# Patient Record
Sex: FEMALE | Race: WHITE | Employment: PART TIME | URBAN - METROPOLITAN AREA
[De-identification: names, ages, dates, MRNs, and addresses within clinical notes are randomized per-mention and may not be internally consistent; named-entity substitution may affect disease eponyms.]

---
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
## 2021-01-03 ENCOUNTER — HEALTH MAINTENANCE LETTER (OUTPATIENT)
Age: 21
End: 2021-01-03

## 2021-02-11 ENCOUNTER — TELEPHONE (OUTPATIENT)
Dept: PEDIATRICS | Facility: CLINIC | Age: 21
End: 2021-02-11

## 2021-02-11 NOTE — TELEPHONE ENCOUNTER
[facility] Call Center    Phone Message    May a detailed message be left on voicemail: yes     Reason for Call: Other: Pt called to schedule return visit     Rachael called and stated she would like to schedule a post-op return visit with Dr. Rahman. Please give her a call soon to help schedule.     Action Taken: Message routed to:  Other: Ped's weight mgmt    Travel Screening: Not Applicable

## 2021-02-12 NOTE — TELEPHONE ENCOUNTER
Called and left message re: Calling to discuss scheduling follow up.  Next available with Dr. Rahman is 3/4/21.  Otherwise, we can schedule for 4/1/21 to see Inez Perez, and Dr. Crawford.  Please call back to discuss what you would like to do.  Left direct call back number.

## 2021-03-01 ENCOUNTER — TELEPHONE (OUTPATIENT)
Dept: PEDIATRICS | Facility: CLINIC | Age: 21
End: 2021-03-01

## 2021-03-01 NOTE — TELEPHONE ENCOUNTER
Called Rachael and left message re; Reminder of appointment with Dr. Rahman on 3/4/21.  Left direct call back number for questions or concerns.

## 2021-03-04 NOTE — PROGRESS NOTES
"      Date: 2021    PATIENT:  Rachael Dao  :          2000  LIANNE:          Mar 4, 2021    Dear Jenna Jo:    I had the pleasure of seeing your patient, Rachael Dao, for a follow-up visit in the [facility] on Mar 4, 2021 at the [facility]. Please see below for my assessment and plan of care.      As you may recall, Rachael is a 20 year old young lady with depression, anxiety, significant asthma and history of ovarian cysts who presents with class 3 obesity.  She had bariatric surgery (LSG) in 2020 (8 mos ago) and presents for a routine follow-up visit.    Intercurrent History:  Since December, Rachael has been having frequent episodes of \"passing out\" and feeling woozey.  She gets light headed, shakey and sweaty.  Eating bread helps the symptoms.  She met with cardiology and neurology - had a tilt table test and 2 EEGs and a holter monitor.  There was a question of POTS but she notes that she experiences an increase in HR but a decrease in BP when goes from recumbent position to standing.  Eating high Na diet helps and she was also started on Florinef and midodrine.  Her BG was checked a few times but was not very low - details are unclear.    Lowest weight was 245 lbs in .  Prn omeparzole    Been on Paxil since October. Helping a little, for depression.  Sees a therapist - but not in a couple mos bc therapist had a baby and will be seeing again next week.   PCP prescribes Paxil.    Review of Systems:  A 10 pt ROS was completed and otherwise negative except as noted above or below.  She is sleeping in late in the day.      Past Medical, Surgical, Social, and Family Histories:  were reviewed today and updated as appropriate.  Was a [employer] until Dec and when she was passing out had to drop out and went to [employer] and now dropped out bc of persistent symptoms.    Current Medications:  Current Outpatient Rx   Medication Sig " "Dispense Refill     acetaminophen (TYLENOL) 325 MG tablet Take 325-650 mg by mouth every 6 hours as needed for mild pain       blood glucose (NO BRAND SPECIFIED) test strip Use to test blood sugar  as directed. 100 strip 0     blood glucose monitoring (NO BRAND SPECIFIED) meter device kit Use to test blood sugar  as directed. 1 kit 0     budesonide-formoterol (SYMBICORT) 160-4.5 MCG/ACT Inhaler Inhale 2 puffs into the lungs At Bedtime        cyanocobalamin (CYANOCOBALAMIN) 1000 MCG/ML injection Inject 1 mL (1,000 mcg) Subcutaneous every 30 days 1 mL 11     fludrocortisone (FLORINEF) 0.1 MG tablet Take 0.1 mg by mouth       hydrocortisone (WESTCORT) 0.2 % external cream Apply topically 2 times daily 15 g 1     hydrocortisone 2.5 % cream Apply topically as needed        JUNEL FE 1.5/30 1.5-30 MG-MCG tablet Take 1 tablet by mouth every evening   3     levalbuterol (XOPENEX) 1.25 MG/3ML neb solution Take 1 ampule by nebulization as needed        midodrine (PROAMATINE) 5 MG tablet TAKE 1 TABLET BY MOUTH 3 TIMES A DAY INSTR PLEASE TAKE THE LAST DOSE OF THE DAY AT 6 PM       montelukast (SINGULAIR) 10 MG tablet Take 10 mg by mouth At Bedtime   0     ondansetron (ZOFRAN-ODT) 4 MG ODT tab Take 1 tablet (4 mg) by mouth every 6 hours as needed for nausea or vomiting 10 tablet 0     PARoxetine (PAXIL-CR) 25 MG 24 hr tablet Take 2 tablets (50 mg) by mouth every morning 60 tablet 0     PARoxetine HCl (PAXIL PO) Take 50 mg by mouth every morning       senna-docusate (SENOKOT-S/PERICOLACE) 8.6-50 MG tablet Take 2 tablets by mouth 2 times daily 60 tablet 0     Syringe/Needle, Disp, (BD ECLIPSE SYRINGE) 25G X 5/8\" 3 ML MISC 1 Syringe every 30 days 1 each 11     vitamin B-12 (CYANOCOBALAMIN) 100 MCG tablet Take 1,000 mcg by mouth every morning         Physical Exam:    Vitals:    B/P:   BP Readings from Last 1 Encounters:   04/01/21 131/79     BP:  Blood pressure percentiles are not available for patients who are 18 years or " "older.  P:   Pulse Readings from Last 1 Encounters:   04/01/21 77     R: @LASTBRATE(1)@    Weight:  Wt Readings from Last 4 Encounters:   04/01/21 118.3 kg (260 lb 12.9 oz) (>99 %, Z= 2.58)*   11/12/20 125 kg (275 lb 8 oz) (>99 %, Z= 2.66)*   08/07/20 133.4 kg (294 lb) (>99 %, Z= 2.73)*   08/04/20 138.3 kg (305 lb) (>99 %, Z= 2.78)*     * Growth percentiles are based on CDC (Girls, 2-20 Years) data.     Height:    Ht Readings from Last 4 Encounters:   04/01/21 1.666 m (5' 5.59\") (70 %, Z= 0.51)*   11/12/20 1.66 m (5' 5.35\") (66 %, Z= 0.42)*   08/07/20 1.676 m (5' 6\") (75 %, Z= 0.68)*   08/04/20 1.676 m (5' 6\") (75 %, Z= 0.68)*     * Growth percentiles are based on CDC (Girls, 2-20 Years) data.       Body Mass Index:  Body mass index is 42.62 kg/m .  Body Mass Index Percentile:  99 %ile (Z= 2.25) based on CDC (Girls, 2-20 Years) BMI-for-age based on BMI available as of 4/1/2021.    Labs:    Results for orders placed or performed in visit on 04/01/21   CBC with platelets [YSB446]     Status: Abnormal   Result Value Ref Range    WBC 12.2 (H) 4.0 - 11.0 10e9/L    RBC Count 4.59 3.8 - 5.2 10e12/L    Hemoglobin 12.2 11.7 - 15.7 g/dL    Hematocrit 38.0 35.0 - 47.0 %    MCV 83 78 - 100 fl    MCH 26.6 26.5 - 33.0 pg    MCHC 32.1 31.5 - 36.5 g/dL    RDW 13.5 10.0 - 15.0 %    Platelet Count 449 150 - 450 10e9/L   Comprehensive metabolic panel [LAB17]     Status: None   Result Value Ref Range    Sodium 137 133 - 144 mmol/L    Potassium 4.1 3.4 - 5.3 mmol/L    Chloride 107 96 - 110 mmol/L    Carbon Dioxide 22 20 - 32 mmol/L    Anion Gap 8 3 - 14 mmol/L    Glucose 80 70 - 99 mg/dL    Urea Nitrogen 9 7 - 30 mg/dL    Creatinine 0.67 0.50 - 1.00 mg/dL    GFR Estimate >90 >60 mL/min/[1.73_m2]    GFR Estimate If Black >90 >60 mL/min/[1.73_m2]    Calcium 9.7 8.5 - 10.1 mg/dL    Bilirubin Total 0.5 0.2 - 1.3 mg/dL    Albumin 3.7 3.4 - 5.0 g/dL    Protein Total 8.0 6.8 - 8.8 g/dL    Alkaline Phosphatase 86 40 - 150 U/L    ALT 21 0 - 50 " U/L    AST 10 0 - 35 U/L   Vitamin D [MJI279]     Status: None   Result Value Ref Range    Vitamin D Deficiency screening 26 20 - 75 ug/L   Parathyroid Hormone Intact [HMV590]     Status: None   Result Value Ref Range    Parathyroid Hormone Intact 58 18 - 80 pg/mL   Vitamin B12 [LAB67]     Status: None   Result Value Ref Range    Vitamin B12 475 193 - 986 pg/mL   Ferritin [LAB68]     Status: None   Result Value Ref Range    Ferritin 50 12 - 150 ng/mL   Hemoglobin A1c [LAB90]     Status: None   Result Value Ref Range    Hemoglobin A1C 5.0 0 - 5.6 %         Assessment:      Rachael is a 20 year old female with depression, anxiety, significant asthma and history of ovarian cysts who presents with class 3 obesity. She is now 8 mos s/p laparoscopic sleeve gastrectomy.  While her weight loss is appropriate she is experiencing recurrent syncopal and near-syncopal episodes of unclear etiology.  She reports having had an extensive work up with a neurologist and cardiologist, including a tilt table test, EEG, holter monitor.  Etiology has not been identified and relationship to eating and blood glucose levels have not been examined closely yet.  Indeed if sx happen after eating, this may be reactive hypoglycemia.  We discussed her weight management and indicates that although she gained about 15 lbs from her elizabeth, she would like to focus on getting her symptoms under control before considering anti obesity medication.  This is very appropriate, particularly bc the high Na diet and florinef are making her retain water and feel bloated.  Her mood is ok but she does not think the Paxil is helping and wants to change it.      Rachael s current problem list reviewed today includes:    Encounter Diagnoses   Name Primary?     Severe obesity (BMI >= 40) (H) Yes     Light headedness      Depression, unspecified depression type      S/P laparoscopic sleeve gastrectomy         Care Plan:  Class 3 obesity  -meet with RD today to discuss high  protein diet  -continue supplements - MVI and Ca and vit B12 injections    Syncopal Episodes  -check blood sugar at beginning, middle and end of episodes  -eat frequent small meals of protein    Depression/anxiety  -I will refill Paxil until she sees her PCP    We are looking forward to seeing Rachael for a follow-up visit in 4 weeks.    Thank you for including me in the care of your patient.  Please do not hesitate to call with questions or concerns.    Sincerely,    Guerita Rahman MD MPH  Diplomate, American Board of Obesity Medicine    Director, Pediatric Weight Management Clinic  Department of Pediatrics  [facility] [phone]  [facility] [phone]  [facility] [phone]  [facility] [place] [phone]            CC  Copy to patient  Kimberly Dao Dennis     [facility] [id number]

## 2021-03-11 NOTE — TELEPHONE ENCOUNTER
Refill request from pharmacy for Actigall/Ursodiol. Refill denied. Patient no longer needs this medication. Patient is more than 6 month post sleeve gastrectomy. [employer] message sent to patient to notify.

## 2021-03-29 ENCOUNTER — TELEPHONE (OUTPATIENT)
Dept: PEDIATRICS | Facility: CLINIC | Age: 21
End: 2021-03-29

## 2021-03-29 NOTE — TELEPHONE ENCOUNTER
Called and spoke to Rachael.  Reminded her of follow up appointment with Dr. Rahman.  Rachael reports she has a ride and will be able to make it to the appointment.  Rachael had no other questions at this time.

## 2021-04-01 ENCOUNTER — OFFICE VISIT (OUTPATIENT)
Dept: PEDIATRICS | Facility: CLINIC | Age: 21
End: 2021-04-01
Attending: DIETITIAN, REGISTERED
Payer: COMMERCIAL

## 2021-04-01 ENCOUNTER — OFFICE VISIT (OUTPATIENT)
Dept: PEDIATRICS | Facility: CLINIC | Age: 21
End: 2021-04-01
Attending: PEDIATRICS
Payer: COMMERCIAL

## 2021-04-01 VITALS
SYSTOLIC BLOOD PRESSURE: 131 MMHG | WEIGHT: 260.8 LBS | DIASTOLIC BLOOD PRESSURE: 79 MMHG | HEART RATE: 77 BPM | HEIGHT: 66 IN | BODY MASS INDEX: 41.91 KG/M2

## 2021-04-01 DIAGNOSIS — F32.A DEPRESSION, UNSPECIFIED DEPRESSION TYPE: ICD-10-CM

## 2021-04-01 DIAGNOSIS — E66.01 SEVERE OBESITY (BMI >= 40) (H): Primary | ICD-10-CM

## 2021-04-01 DIAGNOSIS — Z98.84 S/P LAPAROSCOPIC SLEEVE GASTRECTOMY: ICD-10-CM

## 2021-04-01 DIAGNOSIS — R42 LIGHT HEADEDNESS: ICD-10-CM

## 2021-04-01 DIAGNOSIS — Z98.84 STATUS POST BARIATRIC SURGERY: ICD-10-CM

## 2021-04-01 PROBLEM — R55 SYNCOPE: Status: ACTIVE | Noted: 2021-02-03

## 2021-04-01 LAB
ALBUMIN SERPL-MCNC: 3.7 G/DL (ref 3.4–5)
ALP SERPL-CCNC: 86 U/L (ref 40–150)
ALT SERPL W P-5'-P-CCNC: 21 U/L (ref 0–50)
ANION GAP SERPL CALCULATED.3IONS-SCNC: 8 MMOL/L (ref 3–14)
AST SERPL W P-5'-P-CCNC: 10 U/L (ref 0–35)
BILIRUB SERPL-MCNC: 0.5 MG/DL (ref 0.2–1.3)
BUN SERPL-MCNC: 9 MG/DL (ref 7–30)
CALCIUM SERPL-MCNC: 9.7 MG/DL (ref 8.5–10.1)
CHLORIDE SERPL-SCNC: 107 MMOL/L (ref 96–110)
CO2 SERPL-SCNC: 22 MMOL/L (ref 20–32)
CREAT SERPL-MCNC: 0.67 MG/DL (ref 0.5–1)
DEPRECATED CALCIDIOL+CALCIFEROL SERPL-MC: 26 UG/L (ref 20–75)
ERYTHROCYTE [DISTWIDTH] IN BLOOD BY AUTOMATED COUNT: 13.5 % (ref 10–15)
FERRITIN SERPL-MCNC: 50 NG/ML (ref 12–150)
GFR SERPL CREATININE-BSD FRML MDRD: >90 ML/MIN/{1.73_M2}
GLUCOSE SERPL-MCNC: 80 MG/DL (ref 70–99)
HBA1C MFR BLD: 5 % (ref 0–5.6)
HCT VFR BLD AUTO: 38 % (ref 35–47)
HGB BLD-MCNC: 12.2 G/DL (ref 11.7–15.7)
MCH RBC QN AUTO: 26.6 PG (ref 26.5–33)
MCHC RBC AUTO-ENTMCNC: 32.1 G/DL (ref 31.5–36.5)
MCV RBC AUTO: 83 FL (ref 78–100)
PLATELET # BLD AUTO: 449 10E9/L (ref 150–450)
POTASSIUM SERPL-SCNC: 4.1 MMOL/L (ref 3.4–5.3)
PROT SERPL-MCNC: 8 G/DL (ref 6.8–8.8)
PTH-INTACT SERPL-MCNC: 58 PG/ML (ref 18–80)
RBC # BLD AUTO: 4.59 10E12/L (ref 3.8–5.2)
SODIUM SERPL-SCNC: 137 MMOL/L (ref 133–144)
VIT B12 SERPL-MCNC: 475 PG/ML (ref 193–986)
WBC # BLD AUTO: 12.2 10E9/L (ref 4–11)

## 2021-04-01 PROCEDURE — G0463 HOSPITAL OUTPT CLINIC VISIT: HCPCS

## 2021-04-01 PROCEDURE — 85027 COMPLETE CBC AUTOMATED: CPT | Performed by: PEDIATRICS

## 2021-04-01 PROCEDURE — 82728 ASSAY OF FERRITIN: CPT | Performed by: PEDIATRICS

## 2021-04-01 PROCEDURE — 80053 COMPREHEN METABOLIC PANEL: CPT | Performed by: PEDIATRICS

## 2021-04-01 PROCEDURE — 82607 VITAMIN B-12: CPT | Performed by: PEDIATRICS

## 2021-04-01 PROCEDURE — 83036 HEMOGLOBIN GLYCOSYLATED A1C: CPT | Performed by: PEDIATRICS

## 2021-04-01 PROCEDURE — 97803 MED NUTRITION INDIV SUBSEQ: CPT | Performed by: DIETITIAN, REGISTERED

## 2021-04-01 PROCEDURE — 99214 OFFICE O/P EST MOD 30 MIN: CPT | Performed by: PEDIATRICS

## 2021-04-01 PROCEDURE — 82306 VITAMIN D 25 HYDROXY: CPT | Performed by: PEDIATRICS

## 2021-04-01 PROCEDURE — 36415 COLL VENOUS BLD VENIPUNCTURE: CPT | Performed by: PEDIATRICS

## 2021-04-01 PROCEDURE — 83970 ASSAY OF PARATHORMONE: CPT | Performed by: PEDIATRICS

## 2021-04-01 RX ORDER — FLUDROCORTISONE ACETATE 0.1 MG/1
0.1 TABLET ORAL
COMMUNITY

## 2021-04-01 RX ORDER — MIDODRINE HYDROCHLORIDE 5 MG/1
TABLET ORAL
COMMUNITY
Start: 2021-02-22

## 2021-04-01 RX ORDER — PAROXETINE HYDROCHLORIDE HEMIHYDRATE 25 MG/1
50 TABLET, FILM COATED, EXTENDED RELEASE ORAL EVERY MORNING
Qty: 60 TABLET | Refills: 0 | Status: SHIPPED | OUTPATIENT
Start: 2021-04-01

## 2021-04-01 ASSESSMENT — PAIN SCALES - GENERAL: PAINLEVEL: NO PAIN (0)

## 2021-04-01 ASSESSMENT — MIFFLIN-ST. JEOR: SCORE: 1968.26

## 2021-04-01 NOTE — NURSING NOTE
"[facility] [[id number]]  Chief Complaint   Patient presents with     RECHECK     weight management     Initial /79   Pulse 77   Ht 5' 5.59\" (166.6 cm)   Wt 260 lb 12.9 oz (118.3 kg)   BMI 42.62 kg/m   Estimated body mass index is 42.62 kg/m  as calculated from the following:    Height as of this encounter: 5' 5.59\" (166.6 cm).    Weight as of this encounter: 260 lb 12.9 oz (118.3 kg).  Medication Reconciliation: complete   Kelly Lim LPN      "

## 2021-04-01 NOTE — LETTER
"  2021      RE: Rachael Dao  [address]  [place] [address]             Date: 2021    PATIENT:  Rachael Dao  :          2000  LIANNE:          Mar 4, 2021    Dear Jenna Jo:    I had the pleasure of seeing your patient, Rachael Dao, for a follow-up visit in the [facility] on Mar 4, 2021 at the [facility]. Please see below for my assessment and plan of care.      As you may recall, Rachael is a 20 year old young lady with depression, anxiety, significant asthma and history of ovarian cysts who presents with class 3 obesity.  She had bariatric surgery (LSG) in 2020 (8 mos ago) and presents for a routine follow-up visit.    Intercurrent History:  Since December, Rachael has been having frequent episodes of \"passing out\" and feeling woozey.  She gets light headed, shakey and sweaty.  Eating bread helps the symptoms.  She met with cardiology and neurology - had a tilt table test and 2 EEGs and a holter monitor.  There was a question of POTS but she notes that she experiences an increase in HR but a decrease in BP when goes from recumbent position to standing.  Eating high Na diet helps and she was also started on Florinef and midodrine.  Her BG was checked a few times but was not very low - details are unclear.    Lowest weight was 245 lbs in .  Prn omeparzole    Been on Paxil since October. Helping a little, for depression.  Sees a therapist - but not in a couple mos bc therapist had a baby and will be seeing again next week.   PCP prescribes Paxil.    Review of Systems:  A 10 pt ROS was completed and otherwise negative except as noted above or below.  She is sleeping in late in the day.      Past Medical, Surgical, Social, and Family Histories:  were reviewed today and updated as appropriate.  Was a [employer] until Dec and when she was passing out had to drop out and went to [employer] and now dropped out bc of persistent " "symptoms.    Current Medications:  Current Outpatient Rx   Medication Sig Dispense Refill     acetaminophen (TYLENOL) 325 MG tablet Take 325-650 mg by mouth every 6 hours as needed for mild pain       blood glucose (NO BRAND SPECIFIED) test strip Use to test blood sugar  as directed. 100 strip 0     blood glucose monitoring (NO BRAND SPECIFIED) meter device kit Use to test blood sugar  as directed. 1 kit 0     budesonide-formoterol (SYMBICORT) 160-4.5 MCG/ACT Inhaler Inhale 2 puffs into the lungs At Bedtime        cyanocobalamin (CYANOCOBALAMIN) 1000 MCG/ML injection Inject 1 mL (1,000 mcg) Subcutaneous every 30 days 1 mL 11     fludrocortisone (FLORINEF) 0.1 MG tablet Take 0.1 mg by mouth       hydrocortisone (WESTCORT) 0.2 % external cream Apply topically 2 times daily 15 g 1     hydrocortisone 2.5 % cream Apply topically as needed        JUNEL FE 1.5/30 1.5-30 MG-MCG tablet Take 1 tablet by mouth every evening   3     levalbuterol (XOPENEX) 1.25 MG/3ML neb solution Take 1 ampule by nebulization as needed        midodrine (PROAMATINE) 5 MG tablet TAKE 1 TABLET BY MOUTH 3 TIMES A DAY INSTR PLEASE TAKE THE LAST DOSE OF THE DAY AT 6 PM       montelukast (SINGULAIR) 10 MG tablet Take 10 mg by mouth At Bedtime   0     ondansetron (ZOFRAN-ODT) 4 MG ODT tab Take 1 tablet (4 mg) by mouth every 6 hours as needed for nausea or vomiting 10 tablet 0     PARoxetine (PAXIL-CR) 25 MG 24 hr tablet Take 2 tablets (50 mg) by mouth every morning 60 tablet 0     PARoxetine HCl (PAXIL PO) Take 50 mg by mouth every morning       senna-docusate (SENOKOT-S/PERICOLACE) 8.6-50 MG tablet Take 2 tablets by mouth 2 times daily 60 tablet 0     Syringe/Needle, Disp, (BD ECLIPSE SYRINGE) 25G X 5/8\" 3 ML MISC 1 Syringe every 30 days 1 each 11     vitamin B-12 (CYANOCOBALAMIN) 100 MCG tablet Take 1,000 mcg by mouth every morning         Physical Exam:    Vitals:    B/P:   BP Readings from Last 1 Encounters:   04/01/21 131/79     BP:  Blood " "pressure percentiles are not available for patients who are 18 years or older.  P:   Pulse Readings from Last 1 Encounters:   04/01/21 77     R: @LASTBRATE(1)@    Weight:  Wt Readings from Last 4 Encounters:   04/01/21 118.3 kg (260 lb 12.9 oz) (>99 %, Z= 2.58)*   11/12/20 125 kg (275 lb 8 oz) (>99 %, Z= 2.66)*   08/07/20 133.4 kg (294 lb) (>99 %, Z= 2.73)*   08/04/20 138.3 kg (305 lb) (>99 %, Z= 2.78)*     * Growth percentiles are based on CDC (Girls, 2-20 Years) data.     Height:    Ht Readings from Last 4 Encounters:   04/01/21 1.666 m (5' 5.59\") (70 %, Z= 0.51)*   11/12/20 1.66 m (5' 5.35\") (66 %, Z= 0.42)*   08/07/20 1.676 m (5' 6\") (75 %, Z= 0.68)*   08/04/20 1.676 m (5' 6\") (75 %, Z= 0.68)*     * Growth percentiles are based on CDC (Girls, 2-20 Years) data.       Body Mass Index:  Body mass index is 42.62 kg/m .  Body Mass Index Percentile:  99 %ile (Z= 2.25) based on CDC (Girls, 2-20 Years) BMI-for-age based on BMI available as of 4/1/2021.    Labs:    Results for orders placed or performed in visit on 04/01/21   CBC with platelets [UJJ833]     Status: Abnormal   Result Value Ref Range    WBC 12.2 (H) 4.0 - 11.0 10e9/L    RBC Count 4.59 3.8 - 5.2 10e12/L    Hemoglobin 12.2 11.7 - 15.7 g/dL    Hematocrit 38.0 35.0 - 47.0 %    MCV 83 78 - 100 fl    MCH 26.6 26.5 - 33.0 pg    MCHC 32.1 31.5 - 36.5 g/dL    RDW 13.5 10.0 - 15.0 %    Platelet Count 449 150 - 450 10e9/L   Comprehensive metabolic panel [LAB17]     Status: None   Result Value Ref Range    Sodium 137 133 - 144 mmol/L    Potassium 4.1 3.4 - 5.3 mmol/L    Chloride 107 96 - 110 mmol/L    Carbon Dioxide 22 20 - 32 mmol/L    Anion Gap 8 3 - 14 mmol/L    Glucose 80 70 - 99 mg/dL    Urea Nitrogen 9 7 - 30 mg/dL    Creatinine 0.67 0.50 - 1.00 mg/dL    GFR Estimate >90 >60 mL/min/[1.73_m2]    GFR Estimate If Black >90 >60 mL/min/[1.73_m2]    Calcium 9.7 8.5 - 10.1 mg/dL    Bilirubin Total 0.5 0.2 - 1.3 mg/dL    Albumin 3.7 3.4 - 5.0 g/dL    Protein Total 8.0 " 6.8 - 8.8 g/dL    Alkaline Phosphatase 86 40 - 150 U/L    ALT 21 0 - 50 U/L    AST 10 0 - 35 U/L   Vitamin D [HKY925]     Status: None   Result Value Ref Range    Vitamin D Deficiency screening 26 20 - 75 ug/L   Parathyroid Hormone Intact [FDM737]     Status: None   Result Value Ref Range    Parathyroid Hormone Intact 58 18 - 80 pg/mL   Vitamin B12 [LAB67]     Status: None   Result Value Ref Range    Vitamin B12 475 193 - 986 pg/mL   Ferritin [LAB68]     Status: None   Result Value Ref Range    Ferritin 50 12 - 150 ng/mL   Hemoglobin A1c [LAB90]     Status: None   Result Value Ref Range    Hemoglobin A1C 5.0 0 - 5.6 %         Assessment:      Rachael is a 20 year old female with depression, anxiety, significant asthma and history of ovarian cysts who presents with class 3 obesity. She is now 8 mos s/p laparoscopic sleeve gastrectomy.  While her weight loss is appropriate she is experiencing recurrent syncopal and near-syncopal episodes of unclear etiology.  She reports having had an extensive work up with a neurologist and cardiologist, including a tilt table test, EEG, holter monitor.  Etiology has not been identified and relationship to eating and blood glucose levels have not been examined closely yet.  Indeed if sx happen after eating, this may be reactive hypoglycemia.  We discussed her weight management and indicates that although she gained about 15 lbs from her elizabeth, she would like to focus on getting her symptoms under control before considering anti obesity medication.  This is very appropriate, particularly bc the high Na diet and florinef are making her retain water and feel bloated.  Her mood is ok but she does not think the Paxil is helping and wants to change it.      Rachael s current problem list reviewed today includes:    Encounter Diagnoses   Name Primary?     Severe obesity (BMI >= 40) (H) Yes     Light headedness      Depression, unspecified depression type      S/P laparoscopic sleeve gastrectomy          Care Plan:  Class 3 obesity  -meet with RD today to discuss high protein diet  -continue supplements - MVI and Ca and vit B12 injections    Syncopal Episodes  -check blood sugar at beginning, middle and end of episodes  -eat frequent small meals of protein    Depression/anxiety  -I will refill Paxil until she sees her PCP    We are looking forward to seeing Rachael for a follow-up visit in 4 weeks.    Thank you for including me in the care of your patient.  Please do not hesitate to call with questions or concerns.    Sincerely,    Guerita Rahman MD MPH  Diplomate, American Board of Obesity Medicine    Director, Pediatric Weight Management Clinic  Department of Pediatrics  [facility] [phone]  [facility] [phone]  [facility] [phone]  [facility] [place] [phone]    Copy to patient    Parent(s) of Rachael Dao  [address]   [place] [address]

## 2021-04-01 NOTE — LETTER
"  4/1/2021      RE: Rachael Dao  [address]  [place] [address]       Medical Nutrition Therapy  Nutrition Reassessment  Patient seen in Pediatric Bariatric Clinic/Pediatric Weight Management Clinic, accompanied by mother     Anthropometrics  Age:  20 year old female   Height:  166.6 cm (5' 5.59\")  Weight:  118.3 kg (260 lb 12.9 oz)  BMI:  42.62  Weight Loss 15 lbs since last clinic visit on 11/12/20.  Initial Consult Weight: 294.5 lbs  Day of Surgery Weight(7/31/20): 313 lbs  Weight loss: - 34 lbs from initial consult; -53 lbs from day of surgery  Anthropometrics consistent with obesity.    Allergies/Intolerances:    Cefdinir, Methylphenidate, and Azithromycin     Nutritional History  Patient seen in [facility] for bariatric nutrition follow up. Patient is 9 months s/p laparoscopic sleeve gastrectomy. Patient reports that she has been doing okay; however, she has been having these episodes of passing out and feeling very woozy. [facility] When she stands up her HR goes  and blood pressure drops. She is encouraged to eat a diet high in sodium and take a steriod - also want her to eat 6 small meals a day. Due to these episodes she was forced to stop school. Eating has been okay. The only thing that bothers her is sugary foods and drinks. Will still have a Herbal protein shake (ranges from 15 grams to 30 grams). She will often have a salad with chicken or steak for lunch. Dinner can vary - chicken and rice.     Current Outpatient Medications   Medication Sig Dispense Refill     acetaminophen (TYLENOL) 325 MG tablet Take 325-650 mg by mouth every 6 hours as needed for mild pain       blood glucose (NO BRAND SPECIFIED) test strip Use to test blood sugar  as directed. 100 strip 0     blood glucose monitoring (NO BRAND SPECIFIED) meter device kit Use to test blood sugar  as directed. 1 kit 0     budesonide-formoterol (SYMBICORT) 160-4.5 MCG/ACT Inhaler Inhale 2 puffs " "into the lungs At Bedtime        cyanocobalamin (CYANOCOBALAMIN) 1000 MCG/ML injection Inject 1 mL (1,000 mcg) Subcutaneous every 30 days 1 mL 11     fludrocortisone (FLORINEF) 0.1 MG tablet Take 0.1 mg by mouth       hydrocortisone (WESTCORT) 0.2 % external cream Apply topically 2 times daily 15 g 1     hydrocortisone 2.5 % cream Apply topically as needed        JUNEL FE 1.5/30 1.5-30 MG-MCG tablet Take 1 tablet by mouth every evening   3     levalbuterol (XOPENEX) 1.25 MG/3ML neb solution Take 1 ampule by nebulization as needed        midodrine (PROAMATINE) 5 MG tablet TAKE 1 TABLET BY MOUTH 3 TIMES A DAY INSTR PLEASE TAKE THE LAST DOSE OF THE DAY AT 6 PM       montelukast (SINGULAIR) 10 MG tablet Take 10 mg by mouth At Bedtime   0     ondansetron (ZOFRAN-ODT) 4 MG ODT tab Take 1 tablet (4 mg) by mouth every 6 hours as needed for nausea or vomiting 10 tablet 0     PARoxetine (PAXIL-CR) 25 MG 24 hr tablet Take 2 tablets (50 mg) by mouth every morning 60 tablet 0     PARoxetine HCl (PAXIL PO) Take 50 mg by mouth every morning       senna-docusate (SENOKOT-S/PERICOLACE) 8.6-50 MG tablet Take 2 tablets by mouth 2 times daily 60 tablet 0     Syringe/Needle, Disp, (BD ECLIPSE SYRINGE) 25G X 5/8\" 3 ML MISC 1 Syringe every 30 days 1 each 11     vitamin B-12 (CYANOCOBALAMIN) 100 MCG tablet Take 1,000 mcg by mouth every morning            Previous Goals & Progress  1. Reduce BMI - ongoing goal ; lost 15 lbs  2. Food Record - goal not met  3. Eliminate liquid calories  - goal met  4. Increase protein at meals - eat first! - ongoing goal  5. Decrease frequency of grains - pasta - ongoing goal     Nutrition Diagnosis  Obesity related to excessive energy intake as evidenced by BMI/age >95th %ile      Interventions & Education  Provided written and verbal education on the following:    Food record  Plate Method  Healthy lunchs  Healthy meals/cooking  Healthy snacks  Healthy beverages  Portion sizes  Increase fruit and vegetable " intake    Reviewed previous nutrition goals and patient's progress since last appointment. Discussed working on eating 6 small meals/snacks throughout the day with diagnosis of DOTS. Discussed the importance of incorporating adequate protein as well to avoid reactive hypoglycemia. Answered nutrition-related questions that mom and pt had, and worked with them to set nutrition goals to work towards until next visit.     Goals  1) Reduce BMI  2) Food Record  3) Eat 6 small meals in a day  4) Protein first at each meal   5) Drink 48-64 fl oz daily       Monitoring/Evaluation  Will continue to monitor progress towards goals and provide education in Pediatric Weight Management.    Spent 30 minutes in consult with patient & mother.     Inez Nagy MS, RD, LD  Pager # [phone]

## 2021-04-02 NOTE — PROGRESS NOTES
"Medical Nutrition Therapy  Nutrition Reassessment  Patient seen in Pediatric Bariatric Clinic/Pediatric Weight Management Clinic, accompanied by mother     Anthropometrics  Age:  20 year old female   Height:  166.6 cm (5' 5.59\")  Weight:  118.3 kg (260 lb 12.9 oz)  BMI:  42.62  Weight Loss 15 lbs since last clinic visit on 11/12/20.  Initial Consult Weight: 294.5 lbs  Day of Surgery Weight(7/31/20): 313 lbs  Weight loss: - 34 lbs from initial consult; -53 lbs from day of surgery  Anthropometrics consistent with obesity.    Allergies/Intolerances:    Cefdinir, Methylphenidate, and Azithromycin     Nutritional History  Patient seen in [facility] for bariatric nutrition follow up. Patient is 9 months s/p laparoscopic sleeve gastrectomy. Patient reports that she has been doing okay; however, she has been having these episodes of passing out and feeling very woozy. She was diagnosed with DOTS by Dr Austin Phelan at [facility]. When she stands up her HR goes  and blood pressure drops. She is encouraged to eat a diet high in sodium and take a steriod - also want her to eat 6 small meals a day. Due to these episodes she was forced to stop school. Eating has been okay. The only thing that bothers her is sugary foods and drinks. Will still have a Herbal protein shake (ranges from 15 grams to 30 grams). She will often have a salad with chicken or steak for lunch. Dinner can vary - chicken and rice.     Current Outpatient Medications   Medication Sig Dispense Refill     acetaminophen (TYLENOL) 325 MG tablet Take 325-650 mg by mouth every 6 hours as needed for mild pain       blood glucose (NO BRAND SPECIFIED) test strip Use to test blood sugar  as directed. 100 strip 0     blood glucose monitoring (NO BRAND SPECIFIED) meter device kit Use to test blood sugar  as directed. 1 kit 0     budesonide-formoterol (SYMBICORT) 160-4.5 MCG/ACT Inhaler Inhale 2 puffs into the lungs At Bedtime        cyanocobalamin (CYANOCOBALAMIN) " "1000 MCG/ML injection Inject 1 mL (1,000 mcg) Subcutaneous every 30 days 1 mL 11     fludrocortisone (FLORINEF) 0.1 MG tablet Take 0.1 mg by mouth       hydrocortisone (WESTCORT) 0.2 % external cream Apply topically 2 times daily 15 g 1     hydrocortisone 2.5 % cream Apply topically as needed        JUNEL FE 1.5/30 1.5-30 MG-MCG tablet Take 1 tablet by mouth every evening   3     levalbuterol (XOPENEX) 1.25 MG/3ML neb solution Take 1 ampule by nebulization as needed        midodrine (PROAMATINE) 5 MG tablet TAKE 1 TABLET BY MOUTH 3 TIMES A DAY INSTR PLEASE TAKE THE LAST DOSE OF THE DAY AT 6 PM       montelukast (SINGULAIR) 10 MG tablet Take 10 mg by mouth At Bedtime   0     ondansetron (ZOFRAN-ODT) 4 MG ODT tab Take 1 tablet (4 mg) by mouth every 6 hours as needed for nausea or vomiting 10 tablet 0     PARoxetine (PAXIL-CR) 25 MG 24 hr tablet Take 2 tablets (50 mg) by mouth every morning 60 tablet 0     PARoxetine HCl (PAXIL PO) Take 50 mg by mouth every morning       senna-docusate (SENOKOT-S/PERICOLACE) 8.6-50 MG tablet Take 2 tablets by mouth 2 times daily 60 tablet 0     Syringe/Needle, Disp, (BD ECLIPSE SYRINGE) 25G X 5/8\" 3 ML MISC 1 Syringe every 30 days 1 each 11     vitamin B-12 (CYANOCOBALAMIN) 100 MCG tablet Take 1,000 mcg by mouth every morning            Previous Goals & Progress  1. Reduce BMI - ongoing goal ; lost 15 lbs  2. Food Record - goal not met  3. Eliminate liquid calories  - goal met  4. Increase protein at meals - eat first! - ongoing goal  5. Decrease frequency of grains - pasta - ongoing goal     Nutrition Diagnosis  Obesity related to excessive energy intake as evidenced by BMI/age >95th %ile      Interventions & Education  Provided written and verbal education on the following:    Food record  Plate Method  Healthy lunchs  Healthy meals/cooking  Healthy snacks  Healthy beverages  Portion sizes  Increase fruit and vegetable intake    Reviewed previous nutrition goals and patient's " progress since last appointment. Discussed working on eating 6 small meals/snacks throughout the day with diagnosis of DOTS. Discussed the importance of incorporating adequate protein as well to avoid reactive hypoglycemia. Answered nutrition-related questions that mom and pt had, and worked with them to set nutrition goals to work towards until next visit.     Goals  1) Reduce BMI  2) Food Record  3) Eat 6 small meals in a day  4) Protein first at each meal   5) Drink 48-64 fl oz daily       Monitoring/Evaluation  Will continue to monitor progress towards goals and provide education in Pediatric Weight Management.    Spent 30 minutes in consult with patient & mother.     Inez Nagy MS, RD, LD  Pager # [phone]

## 2021-04-25 ENCOUNTER — HEALTH MAINTENANCE LETTER (OUTPATIENT)
Age: 21
End: 2021-04-25

## 2021-05-19 ENCOUNTER — TELEPHONE (OUTPATIENT)
Dept: PEDIATRICS | Facility: CLINIC | Age: 21
End: 2021-05-19

## 2021-05-19 NOTE — TELEPHONE ENCOUNTER
Called and spoke to Rachael.  Rachael reports she is doing well.  Asked if she was able to get glucometer from pharmacy.  Rachael did get the glucometer.  She has not used the glucometer.  She reports she has not had as often symptoms of low blood sugar.  She also feels like the symptoms are not as bad as they use to be.    Rachael had no other questions or concerns.    Scheduled follow up appointments for 7/1/21.

## 2021-06-28 ENCOUNTER — TELEPHONE (OUTPATIENT)
Dept: PEDIATRICS | Facility: CLINIC | Age: 21
End: 2021-06-28

## 2021-06-28 NOTE — TELEPHONE ENCOUNTER
Called and spoke to Rachael.  Reminded her of appointments on 7/1/21.  Went over the providers that she will be seeing.  Rachael had no questions at this time.

## 2021-06-30 ENCOUNTER — TELEPHONE (OUTPATIENT)
Dept: PEDIATRICS | Facility: CLINIC | Age: 21
End: 2021-06-30

## 2021-06-30 NOTE — TELEPHONE ENCOUNTER
[facility] Call Center    Phone Message    May a detailed message be left on voicemail: yes     Reason for Call: Appt 07/01     Patient mom along with patient is calling to see if appt schedule with Weight Mgt care team tomorrow Thurs 07/01 could be switch to virtual. Patient is having flared up POTS and doesn't feel comfortable driving 8 hours to the city, they are in [place].   Please call patient back at [phone] and okay to leave detail voice mail. Sent High Priority

## 2021-06-30 NOTE — TELEPHONE ENCOUNTER
Called and spoke to Rachael.  Let Rachael know that she would have to travel to [place] to do a virtual visit.  Appointments rescheduled for 8/5/21.  Encouraged Rachael to call back with any questions or concerns.

## 2021-08-02 ENCOUNTER — TELEPHONE (OUTPATIENT)
Dept: PEDIATRICS | Facility: CLINIC | Age: 21
End: 2021-08-02

## 2021-08-02 NOTE — TELEPHONE ENCOUNTER
Called and spoke to Rachael.  Reminded her of appoitnments on 8/5/21.  Rachael is going to start at the [employer] in the fall.  Requested to reschedule appointments when she has moved in so she does not have to travel down twice.  Rescheduled appointments to 9/23/21.  Encouraged her to call back with any questions or concerns.

## 2021-09-21 DIAGNOSIS — R42 LIGHT HEADEDNESS: ICD-10-CM

## 2021-09-21 NOTE — TELEPHONE ENCOUNTER
Refill request received from: [facility]  Medication Requested: Accu check guide [place] Glucose meter  Directions:use to test blood sugar as directed  Quantity:1  Last Office Visit: 5/1/21  Next Appointment Scheduled for: 9/23/21  Last refill: 4/1/21  Sent To:  LENNY Callejas.  Mera Kimble LPN

## 2021-10-10 ENCOUNTER — HEALTH MAINTENANCE LETTER (OUTPATIENT)
Age: 21
End: 2021-10-10

## 2021-10-13 ENCOUNTER — HOSPITAL ENCOUNTER (EMERGENCY)
Facility: CLINIC | Age: 21
Discharge: HOME | End: 2021-10-13
Attending: PHYSICIAN ASSISTANT | Admitting: PHYSICIAN ASSISTANT

## 2021-10-13 ENCOUNTER — APPOINTMENT (OUTPATIENT)
Dept: GENERAL RADIOLOGY | Facility: CLINIC | Age: 21
End: 2021-10-13
Attending: PHYSICIAN ASSISTANT

## 2021-10-13 VITALS
HEIGHT: 66 IN | DIASTOLIC BLOOD PRESSURE: 69 MMHG | SYSTOLIC BLOOD PRESSURE: 130 MMHG | BODY MASS INDEX: 40.02 KG/M2 | WEIGHT: 249 LBS | HEART RATE: 82 BPM | RESPIRATION RATE: 16 BRPM | OXYGEN SATURATION: 98 % | TEMPERATURE: 97.9 F

## 2021-10-13 DIAGNOSIS — M25.511 RIGHT SHOULDER PAIN: ICD-10-CM

## 2021-10-13 DIAGNOSIS — M54.2 NECK PAIN: ICD-10-CM
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
## 2021-10-13 PROCEDURE — 250N000013 HC RX MED GY IP 250 OP 250 PS 637: Performed by: PHYSICIAN ASSISTANT

## 2021-10-13 PROCEDURE — 99283 EMERGENCY DEPT VISIT LOW MDM: CPT

## 2021-10-13 PROCEDURE — 73030 X-RAY EXAM OF SHOULDER: CPT | Mod: RT

## 2021-10-13 RX ORDER — ACETAMINOPHEN 500 MG
1000 TABLET ORAL ONCE
Status: COMPLETED | OUTPATIENT
Start: 2021-10-13 | End: 2021-10-13

## 2021-10-13 RX ADMIN — ACETAMINOPHEN 1000 MG: 500 TABLET, FILM COATED ORAL at 17:39

## 2021-10-13 ASSESSMENT — ENCOUNTER SYMPTOMS
ABDOMINAL PAIN: 0
COUGH: 1
NUMBNESS: 0
SHORTNESS OF BREATH: 0
ARTHRALGIAS: 1
NECK PAIN: 1
WEAKNESS: 0

## 2021-10-13 ASSESSMENT — MIFFLIN-ST. JEOR: SCORE: 1916.21
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
## 2021-10-13 NOTE — DISCHARGE INSTRUCTIONS
Discharge Instructions  Neck Strain    You have been seen today for a neck sprain or strain.  Neck strains usually result from an injury to the neck. Car accidents, contact sports, and falls are common causes of neck strain. Sometimes your neck can start to hurt because of increased activity, muscle tension, an abnormal sleeping position, or because of other problems like arthritis in the neck.     Neck pain usually comes from injured muscles and ligaments. Sometimes there is a herniated ( slipped ) disc. We do not usually do MRI scans to look for these right away, since most herniated discs will get better on their own with time. Today, we did not find any evidence that your neck pain was caused by a serious or dangerous condition. However, sometimes symptoms develop over time and cannot be found during an emergency visit, so it is very important that you follow up with your primary provider.    Generally, every Emergency Department visit should have a follow-up clinic visit with either a primary or a specialty clinic/provider. Please follow-up as instructed by your emergency provider today.    Return to the Emergency Department if:  You have increasing pain in your neck.  You develop difficulty swallowing or breathing.  You have numbness, weakness, or trouble moving your arms or legs.  You have severe dizziness and difficulty walking.  You are unable to control your bladder or bowels.  You develop severe headache or ringing in the ears.    What can I do to help myself at home?  If you had an injury, use cold for the first 1-2 days. Cold helps relieve pain and reduce inflammation.  Apply ice packs to the neck or areas of pain every 1-2 hours for 20 minutes at a time. Place a towel or cloth between your skin and the ice pack.  After the first 2 days, using heat can help with neck pain and stiffness. You may use a warm shower or bath, warm towels on the neck, or a heating pad. Do not sleep with a heating pad, as you  can be burned.   Pain medications - You may take a pain medication such as Tylenol  (acetaminophen), Advil  and Motrin  (ibuprofen), or Aleve  (naproxen).  It is usually best to rest the neck for 1-2 days after an injury, then start gentle stretching exercises.   It is helpful to place a small pillow under the nape of your neck to provide proper neutral positioning.   You should stay active and do your usual work as much as you can, unless this involves heavy physical labor. Ask your provider if you need work restrictions.  If you were given a prescription for medicine here today, be sure to read all of the information (including the package insert) that comes with your prescription.  This will include important information about the medicine, its side effects, and any warnings that you need to know about.  The pharmacist who fills the prescription can provide more information and answer questions you may have about the medicine.  If you have questions or concerns that the pharmacist cannot address, please call or return to the Emergency Department.   Remember that you can always come back to the Emergency Department if you are not able to see your regular provider in the amount of time listed above, if you get any new symptoms, or if there is anything that worries you.    Discharge Instructions  Extremity Injury    You were seen today for an injury to an extremity (arm, hand, leg, or foot). You may have a bruise, strain, or fracture (broken bone).    Generally, every Emergency Department visit should have a follow-up clinic visit with either a primary or a specialty clinic/provider. Please follow-up as instructed by your emergency provider today.  Return to the Emergency Department right away if:  Your pain seems to change or get worse or there is pain in a new area that wasn t evaluated today.  Your extremity becomes pale, cool, blue, or numb or tingling past the injury.  You have more drainage, redness or pain in  the area of the cut or abrasion.  You have pain that you cannot control with the medicine recommended or prescribed here, or you have pain that seems too much for your injury.  Your child (who is injured) will not stop crying or is much more fussy than normal.  You have new symptoms or anything that worries you.    What to Expect:  Your swelling and pain may be worse the day after your injury, but should not be severe and should start getting better after that. You should not have new symptoms and your pain should not get worse.  You may start to get a bruise over the injured area or below the injured area (bruising can follow gravity).  Your movement and strength should get better with time.  Some injuries may not show up until after you have left the Emergency Department so it is important to follow-up as directed.  Your injury may prevent you from working.  Follow-up with your regular provider to get a work release note.  Pain medications or your injury may make it unsafe to drive or operate machinery.    Home Care:  RICE: Rest, Ice, Compression, Elevation  Rest: Rest your injured area for at least 1-2 days. After that you may start using your extremity again as long as there is not too much pain.   Ice: Apply ice your injured area for 15 minutes at a time, at least 3 times a day. Use a cloth between the ice bag and your skin to prevent frostbite. Do not sleep with an ice pack or heating pad on, since this can cause burns or skin injury.  Compression: You may use an elastic bandage (Ace  Wrap) if it makes you more comfortable. Wrap it just tight enough to provide light compression, like a new pair of socks feels. Loosen the bandage if you have swelling past the bandage.  Elevation: Raise the injured area above the level of your heart as much as possible in the first 1-2 days.    Use Tylenol  (acetaminophen), Motrin (ibuprofen), or Advil  (ibuprofen) for your pain unless you have an allergy or are told not to use  these medications by your provider.  Take the medications as instructed on the package. Tylenol  (acetaminophen) is in many prescription medicines and non-prescription medicines--check all of your medicines to be sure you aren t taking more than 3000 mg per day.  Please follow any other instructions that were discussed with you by your provider.    Stretching/Exercises:  You may have been provided with instructions for stretching or exercises. If your injury was to your arm or shoulder and your provider put you in a sling or an immobilizer, it is important that you take off your immobilizer within 3 days and stretch/move your shoulder, unless your provider specifically tells you to not move your shoulder.  This is to prevent further injury such as a  frozen shoulder .     If you were given a prescription for medicine here today, be sure to read all of the information (including the package insert) that comes with your prescription.  This will include important information about the medicine, its side effects, and any warnings that you need to know about.  The pharmacist who fills the prescription can provide more information and answer questions you may have about the medicine.  If you have questions or concerns that the pharmacist cannot address, please call or return to the Emergency Department.     Remember that you can always come back to the Emergency Department if you are not able to see your regular provider in the amount of time listed above, if you get any new symptoms, or if there is anything that worries you.

## 2022-05-21 ENCOUNTER — HEALTH MAINTENANCE LETTER (OUTPATIENT)
Age: 22
End: 2022-05-21

## 2022-07-28 ENCOUNTER — TELEPHONE (OUTPATIENT)
Dept: PEDIATRICS | Facility: CLINIC | Age: 22
End: 2022-07-28

## 2022-07-28 NOTE — TELEPHONE ENCOUNTER
Called and spoke to Rachael to remind her that she is due for her annual check up for weight loss surgery.  Rachael is now living in [place].  She is no longer in school, but working in the [place] area.    Discussed scheduling an appointment at a [facility] in her area for a post-op appointment.  Rachael will look up clinic and give them a call.    Encouraged Rachael to call back if she needed anything.

## 2022-09-18 ENCOUNTER — HEALTH MAINTENANCE LETTER (OUTPATIENT)
Age: 22
End: 2022-09-18

## 2023-06-04 ENCOUNTER — HEALTH MAINTENANCE LETTER (OUTPATIENT)
Age: 23
End: 2023-06-04

## 2024-07-14 ENCOUNTER — HEALTH MAINTENANCE LETTER (OUTPATIENT)
Age: 24
End: 2024-07-14

## (undated) DEVICE — ENDO TROCAR FIRST ENTRY KII FIOS ADV FIX 05X100MM CFF03

## (undated) DEVICE — ESU LIGASURE MARYLAND VESSEL LAP 44CM XLONG LF1944

## (undated) DEVICE — ANTIFOG SOLUTION W/FOAM PAD 31142527

## (undated) DEVICE — PREP CHLORAPREP 26ML TINTED ORANGE  260815

## (undated) DEVICE — ENDO TROCAR OPTICAL ACCESS KII Z-THRD 15X100MM C0R37

## (undated) DEVICE — NDL INSUFFLATION 13GA 150MM C2202

## (undated) DEVICE — LIGHT HANDLE X1 31140133

## (undated) DEVICE — ENDO TROCAR SLEEVE KII ADV FIXATION 05X100MM CFS02

## (undated) DEVICE — SYR 30ML LL W/O NDL 302832

## (undated) DEVICE — DRSG PRIMAPORE 02X3" 7133

## (undated) DEVICE — ENDO POUCH UNIVERSAL RETRIEVAL SYSTEM INZII 12/15MM CD004

## (undated) DEVICE — LINEN TOWEL PACK X30 5481

## (undated) DEVICE — GLOVE PROTEXIS POWDER FREE SMT 7.5  2D72PT75X

## (undated) DEVICE — CLIP APPLIER ENDO 5MM M/L LIGAMAX EL5ML

## (undated) DEVICE — STPL RELOAD REG TISSUE ECHELON 60 X 3.6MM BLUE GST60B

## (undated) DEVICE — Device

## (undated) DEVICE — ESU GROUND PAD ADULT W/CORD E7507

## (undated) DEVICE — NDL SPINAL 22GA 3.5" QUINCKE 405181

## (undated) DEVICE — STPL POWERED ECHELON LONG 60MM PLEE60A

## (undated) DEVICE — LINEN TOWEL PACK X6 WHITE 5487

## (undated) DEVICE — STPL SKIN 35W ROTATING HEAD PRW35

## (undated) DEVICE — COVER CAMERA IN-LIGHT DISP LT-C02

## (undated) RX ORDER — GLYCOPYRROLATE 0.2 MG/ML
INJECTION, SOLUTION INTRAMUSCULAR; INTRAVENOUS
Status: DISPENSED
Start: 2020-07-31

## (undated) RX ORDER — OXYCODONE HYDROCHLORIDE 5 MG/1
TABLET ORAL
Status: DISPENSED
Start: 2020-07-31

## (undated) RX ORDER — DEXAMETHASONE SODIUM PHOSPHATE 4 MG/ML
INJECTION, SOLUTION INTRA-ARTICULAR; INTRALESIONAL; INTRAMUSCULAR; INTRAVENOUS; SOFT TISSUE
Status: DISPENSED
Start: 2020-07-31

## (undated) RX ORDER — HYDROMORPHONE HYDROCHLORIDE 1 MG/ML
INJECTION, SOLUTION INTRAMUSCULAR; INTRAVENOUS; SUBCUTANEOUS
Status: DISPENSED
Start: 2020-07-31

## (undated) RX ORDER — ONDANSETRON 2 MG/ML
INJECTION INTRAMUSCULAR; INTRAVENOUS
Status: DISPENSED
Start: 2020-07-31

## (undated) RX ORDER — LABETALOL 20 MG/4 ML (5 MG/ML) INTRAVENOUS SYRINGE
Status: DISPENSED
Start: 2020-07-31

## (undated) RX ORDER — BUPIVACAINE HYDROCHLORIDE 2.5 MG/ML
INJECTION, SOLUTION EPIDURAL; INFILTRATION; INTRACAUDAL
Status: DISPENSED
Start: 2020-07-31

## (undated) RX ORDER — FENTANYL CITRATE 50 UG/ML
INJECTION, SOLUTION INTRAMUSCULAR; INTRAVENOUS
Status: DISPENSED
Start: 2020-07-31

## (undated) RX ORDER — PROPOFOL 10 MG/ML
INJECTION, EMULSION INTRAVENOUS
Status: DISPENSED
Start: 2020-07-31

## (undated) RX ORDER — CEFAZOLIN SODIUM 2 G/100ML
INJECTION, SOLUTION INTRAVENOUS
Status: DISPENSED
Start: 2020-07-31

## (undated) RX ORDER — LIDOCAINE HYDROCHLORIDE 20 MG/ML
INJECTION, SOLUTION EPIDURAL; INFILTRATION; INTRACAUDAL; PERINEURAL
Status: DISPENSED
Start: 2020-07-31